# Patient Record
Sex: FEMALE | Race: OTHER | NOT HISPANIC OR LATINO | URBAN - METROPOLITAN AREA
[De-identification: names, ages, dates, MRNs, and addresses within clinical notes are randomized per-mention and may not be internally consistent; named-entity substitution may affect disease eponyms.]

---

## 2017-04-15 ENCOUNTER — INPATIENT (INPATIENT)
Facility: HOSPITAL | Age: 82
LOS: 0 days | Discharge: LEFT BEFORE DISCHARGE | DRG: 202 | End: 2017-04-16
Attending: INTERNAL MEDICINE | Admitting: INTERNAL MEDICINE
Payer: MEDICARE

## 2017-04-15 VITALS
TEMPERATURE: 98 F | OXYGEN SATURATION: 96 % | DIASTOLIC BLOOD PRESSURE: 69 MMHG | WEIGHT: 139.99 LBS | HEIGHT: 62 IN | RESPIRATION RATE: 16 BRPM | SYSTOLIC BLOOD PRESSURE: 127 MMHG | HEART RATE: 94 BPM

## 2017-04-15 LAB
ALBUMIN SERPL ELPH-MCNC: 2.7 G/DL — LOW (ref 3.5–5)
ALP SERPL-CCNC: 80 U/L — SIGNIFICANT CHANGE UP (ref 40–120)
ALT FLD-CCNC: 20 U/L DA — SIGNIFICANT CHANGE UP (ref 10–60)
ANION GAP SERPL CALC-SCNC: 8 MMOL/L — SIGNIFICANT CHANGE UP (ref 5–17)
APTT BLD: 32.5 SEC — SIGNIFICANT CHANGE UP (ref 27.5–37.4)
AST SERPL-CCNC: 20 U/L — SIGNIFICANT CHANGE UP (ref 10–40)
BASOPHILS # BLD AUTO: 0 K/UL — SIGNIFICANT CHANGE UP (ref 0–0.2)
BASOPHILS NFR BLD AUTO: 0.7 % — SIGNIFICANT CHANGE UP (ref 0–2)
BILIRUB SERPL-MCNC: 0.6 MG/DL — SIGNIFICANT CHANGE UP (ref 0.2–1.2)
BUN SERPL-MCNC: 11 MG/DL — SIGNIFICANT CHANGE UP (ref 7–18)
CALCIUM SERPL-MCNC: 9 MG/DL — SIGNIFICANT CHANGE UP (ref 8.4–10.5)
CHLORIDE SERPL-SCNC: 101 MMOL/L — SIGNIFICANT CHANGE UP (ref 96–108)
CK MB BLD-MCNC: 5.7 % — HIGH (ref 0–3.5)
CK MB CFR SERPL CALC: 1.2 NG/ML — SIGNIFICANT CHANGE UP (ref 0–3.6)
CK SERPL-CCNC: 21 U/L — SIGNIFICANT CHANGE UP (ref 21–215)
CO2 SERPL-SCNC: 28 MMOL/L — SIGNIFICANT CHANGE UP (ref 22–31)
CREAT SERPL-MCNC: 0.87 MG/DL — SIGNIFICANT CHANGE UP (ref 0.5–1.3)
EOSINOPHIL # BLD AUTO: 0 K/UL — SIGNIFICANT CHANGE UP (ref 0–0.5)
EOSINOPHIL NFR BLD AUTO: 0.2 % — SIGNIFICANT CHANGE UP (ref 0–6)
GLUCOSE SERPL-MCNC: 167 MG/DL — HIGH (ref 70–99)
HCT VFR BLD CALC: 40.5 % — SIGNIFICANT CHANGE UP (ref 34.5–45)
HGB BLD-MCNC: 12.9 G/DL — SIGNIFICANT CHANGE UP (ref 11.5–15.5)
INR BLD: 1.04 RATIO — SIGNIFICANT CHANGE UP (ref 0.88–1.16)
LYMPHOCYTES # BLD AUTO: 1.2 K/UL — SIGNIFICANT CHANGE UP (ref 1–3.3)
LYMPHOCYTES # BLD AUTO: 28.4 % — SIGNIFICANT CHANGE UP (ref 13–44)
MCHC RBC-ENTMCNC: 30.6 PG — SIGNIFICANT CHANGE UP (ref 27–34)
MCHC RBC-ENTMCNC: 31.9 GM/DL — LOW (ref 32–36)
MCV RBC AUTO: 96 FL — SIGNIFICANT CHANGE UP (ref 80–100)
MONOCYTES # BLD AUTO: 0.1 K/UL — SIGNIFICANT CHANGE UP (ref 0–0.9)
MONOCYTES NFR BLD AUTO: 2.8 % — SIGNIFICANT CHANGE UP (ref 2–14)
NEUTROPHILS # BLD AUTO: 2.8 K/UL — SIGNIFICANT CHANGE UP (ref 1.8–7.4)
NEUTROPHILS NFR BLD AUTO: 68 % — SIGNIFICANT CHANGE UP (ref 43–77)
NT-PROBNP SERPL-SCNC: 410 PG/ML — SIGNIFICANT CHANGE UP (ref 0–450)
PLATELET # BLD AUTO: 218 K/UL — SIGNIFICANT CHANGE UP (ref 150–400)
POTASSIUM SERPL-MCNC: 4.1 MMOL/L — SIGNIFICANT CHANGE UP (ref 3.5–5.3)
POTASSIUM SERPL-SCNC: 4.1 MMOL/L — SIGNIFICANT CHANGE UP (ref 3.5–5.3)
PROT SERPL-MCNC: 6.8 G/DL — SIGNIFICANT CHANGE UP (ref 6–8.3)
PROTHROM AB SERPL-ACNC: 11.3 SEC — SIGNIFICANT CHANGE UP (ref 9.8–12.7)
RBC # BLD: 4.22 M/UL — SIGNIFICANT CHANGE UP (ref 3.8–5.2)
RBC # FLD: 13.1 % — SIGNIFICANT CHANGE UP (ref 10.3–14.5)
SODIUM SERPL-SCNC: 137 MMOL/L — SIGNIFICANT CHANGE UP (ref 135–145)
TROPONIN I SERPL-MCNC: <0.015 NG/ML — SIGNIFICANT CHANGE UP (ref 0–0.04)
WBC # BLD: 4.1 K/UL — SIGNIFICANT CHANGE UP (ref 3.8–10.5)
WBC # FLD AUTO: 4.1 K/UL — SIGNIFICANT CHANGE UP (ref 3.8–10.5)

## 2017-04-15 PROCEDURE — 71010: CPT | Mod: 26

## 2017-04-15 RX ORDER — SODIUM CHLORIDE 9 MG/ML
3 INJECTION INTRAMUSCULAR; INTRAVENOUS; SUBCUTANEOUS ONCE
Qty: 0 | Refills: 0 | Status: COMPLETED | OUTPATIENT
Start: 2017-04-15 | End: 2017-04-15

## 2017-04-15 RX ORDER — IPRATROPIUM/ALBUTEROL SULFATE 18-103MCG
3 AEROSOL WITH ADAPTER (GRAM) INHALATION ONCE
Qty: 0 | Refills: 0 | Status: COMPLETED | OUTPATIENT
Start: 2017-04-15 | End: 2017-04-15

## 2017-04-15 RX ADMIN — Medication 3 MILLILITER(S): at 21:47

## 2017-04-15 RX ADMIN — Medication 3 MILLILITER(S): at 22:12

## 2017-04-15 RX ADMIN — SODIUM CHLORIDE 3 MILLILITER(S): 9 INJECTION INTRAMUSCULAR; INTRAVENOUS; SUBCUTANEOUS at 21:47

## 2017-04-15 NOTE — ED ADULT NURSE NOTE - OBJECTIVE STATEMENT
89 years old female received being nursed in high folwers position, alert and oriieted, breathing sponateoulsy on roomair. Relatievs at bedside reported that patient has history of Asthma, was having breathing difficulty and became unresponsive earlier.nresponsiveness. per family, pt had poor sleep last night 2/2 sore throat and mild difficulty breathing, was difficult to rouse for several seconds. family member dr. ian pierre [phone], pulmcrit MD advuised to give prednisone and zyrtec which briefly resolved sx, however this afternoon was much more difficult to rouse with respiratory distress vs. stridor, reported to be shaking head side to side during event. family states pt was recently d/c'd from OSH to rehab for resp distress thought to be asthma vs. URI, has been confused since. 89 years old female received being nursed in high folwers position, alert and oriented, breathing spontaneously on room air. Relatives at bedside reported that patient has history of Asthma, was having breathing difficulty and was difficult to arouse earlier. Relatives also reported that patient had poor sleep last night, complained of a sore throat. Relative also reported that patient was recently discharged from OSH to rehab for respiratory distress thought to be asthma vs. URI, has been confused since.

## 2017-04-15 NOTE — ED PROVIDER NOTE - MEDICAL DECISION MAKING DETAILS
89f pmhx dCHF, asthma p/w unresponsiveness. per family, pt had poor sleep last night 2/2 sore throat and mild difficulty breathing, was difficult to rouse for several seconds. family member dr. ian pierre [phone], pulmcrit MD advuised to give prednisone and zyrtec which briefly resolved sx, however this afternoon was much more difficult to rouse with respiratory distress vs. stridor, reported to be shaking head side to side during event. family states pt was recently d/c'd from OSH to rehab for resp distress thought to be asthma vs. URI, has been confused since. states pt still confused per baseline. on PE, VSS, in mild distress, Aox1 (self), rhonchi/wheezes b/l, +3 pedal edema. will send cardiac panel.

## 2017-04-15 NOTE — ED PROVIDER NOTE - PHYSICAL EXAMINATION
PE: CONSTITUTIONAL: Well appearing, well nourished, in mild distress. ENMT: Airway patent, nasal mucosa clear, mouth with normal mucosa. HEAD: NCAT EYES: PERRL, EOMI CARDIAC: RRR, no m/r/g, +3 pedal edema b/l RESPIRATORY: diffuse wheeze/rhonchi b/l GI: Abdomen non-distended, non-tender MSK: Spine appears normal, range of motion is not limited, no muscle/joint tenderness NEURO: CNII-XII grossly intact, 5/5 strength, full sensation all extremities, gait no assessed SKIN: No rash

## 2017-04-15 NOTE — ED PROVIDER NOTE - NS ED ROS FT
ROS: GENERAL: no fevers, no chills HEENT: no epistaxis, no eye pain, no ear, no throat pain CARDIAC: no chest pain, +shortness of breath PULM: no cough, +shortness of breath GI: no nausea, no vomiting, no diarrhea,  no abdominal pain, no hematemesis, no bright red blood per rectum : no dysuria, no hematuria EXTREMITIES: no arm pain, no leg pain, no back pain SKIN: no purpura, no petechiae NEURO: no headache, no neck pain, no loss of strength/sensation, +LOC, +confusion HEME: no easy bruising, no easy bleeding

## 2017-04-15 NOTE — ED PROVIDER NOTE - OBJECTIVE STATEMENT
89f pmhx dCHF, asthma p/w unresponsiveness. per family, pt had poor sleep last night 2/2 sore throat and mild difficulty breathing, was difficult to rouse for several seconds. family member dr. ian pierre [phone], pulmcrit MD advuised to give prednisone and zyrtec which briefly resolved sx, however this afternoon was much more difficult to rouse with respiratory distress vs. stridor, reported to be shaking head side to side during event. family states pt was recently d/c'd from OSH to rehab for resp distress thought to be asthma vs. URI, has been confused since. states pt still confused per baseline.

## 2017-04-16 VITALS
TEMPERATURE: 98 F | DIASTOLIC BLOOD PRESSURE: 53 MMHG | HEART RATE: 96 BPM | OXYGEN SATURATION: 100 % | RESPIRATION RATE: 16 BRPM | SYSTOLIC BLOOD PRESSURE: 107 MMHG

## 2017-04-16 DIAGNOSIS — J45.909 UNSPECIFIED ASTHMA, UNCOMPLICATED: ICD-10-CM

## 2017-04-16 DIAGNOSIS — I50.9 HEART FAILURE, UNSPECIFIED: ICD-10-CM

## 2017-04-16 DIAGNOSIS — F03.90 UNSPECIFIED DEMENTIA, UNSPECIFIED SEVERITY, WITHOUT BEHAVIORAL DISTURBANCE, PSYCHOTIC DISTURBANCE, MOOD DISTURBANCE, AND ANXIETY: ICD-10-CM

## 2017-04-16 DIAGNOSIS — Z29.9 ENCOUNTER FOR PROPHYLACTIC MEASURES, UNSPECIFIED: ICD-10-CM

## 2017-04-16 DIAGNOSIS — R55 SYNCOPE AND COLLAPSE: ICD-10-CM

## 2017-04-16 DIAGNOSIS — Z71.89 OTHER SPECIFIED COUNSELING: ICD-10-CM

## 2017-04-16 LAB
APPEARANCE UR: CLEAR — SIGNIFICANT CHANGE UP
BASE EXCESS BLDA CALC-SCNC: 3.1 MMOL/L — HIGH (ref -2–2)
BILIRUB UR-MCNC: NEGATIVE — SIGNIFICANT CHANGE UP
CHOLEST SERPL-MCNC: 208 MG/DL — HIGH (ref 10–199)
CK MB BLD-MCNC: 5.8 % — HIGH (ref 0–3.5)
CK MB CFR SERPL CALC: 1.1 NG/ML — SIGNIFICANT CHANGE UP (ref 0–3.6)
CK SERPL-CCNC: 19 U/L — LOW (ref 21–215)
COLOR SPEC: YELLOW — SIGNIFICANT CHANGE UP
D DIMER BLD IA.RAPID-MCNC: 3003 NG/ML DDU — HIGH
DIFF PNL FLD: ABNORMAL
GLUCOSE UR QL: NEGATIVE — SIGNIFICANT CHANGE UP
HBA1C BLD-MCNC: 5.7 % — HIGH (ref 4–5.6)
HCO3 BLDA-SCNC: 26 MMOL/L — SIGNIFICANT CHANGE UP (ref 23–27)
HDLC SERPL-MCNC: 54 MG/DL — SIGNIFICANT CHANGE UP (ref 40–125)
HOROWITZ INDEX BLDA+IHG-RTO: 21 — SIGNIFICANT CHANGE UP
KETONES UR-MCNC: NEGATIVE — SIGNIFICANT CHANGE UP
LEUKOCYTE ESTERASE UR-ACNC: NEGATIVE — SIGNIFICANT CHANGE UP
LIPID PNL WITH DIRECT LDL SERPL: 138 MG/DL — SIGNIFICANT CHANGE UP
NITRITE UR-MCNC: NEGATIVE — SIGNIFICANT CHANGE UP
PCO2 BLDA: 36 MMHG — SIGNIFICANT CHANGE UP (ref 32–46)
PH BLDA: 7.47 — HIGH (ref 7.35–7.45)
PH UR: 6 — SIGNIFICANT CHANGE UP (ref 4.8–8)
PO2 BLDA: 57 MMHG — LOW (ref 74–108)
PROT UR-MCNC: NEGATIVE — SIGNIFICANT CHANGE UP
SAO2 % BLDA: 90 % — LOW (ref 92–96)
SP GR SPEC: 1.01 — SIGNIFICANT CHANGE UP (ref 1.01–1.02)
TOTAL CHOLESTEROL/HDL RATIO MEASUREMENT: 3.9 RATIO — SIGNIFICANT CHANGE UP (ref 3.3–7.1)
TRIGL SERPL-MCNC: 81 MG/DL — SIGNIFICANT CHANGE UP (ref 10–149)
TROPONIN I SERPL-MCNC: <0.015 NG/ML — SIGNIFICANT CHANGE UP (ref 0–0.04)
TSH SERPL-MCNC: 0.75 UU/ML — SIGNIFICANT CHANGE UP (ref 0.34–4.82)
UROBILINOGEN FLD QL: 1

## 2017-04-16 PROCEDURE — 81001 URINALYSIS AUTO W/SCOPE: CPT

## 2017-04-16 PROCEDURE — 80053 COMPREHEN METABOLIC PANEL: CPT

## 2017-04-16 PROCEDURE — 85027 COMPLETE CBC AUTOMATED: CPT

## 2017-04-16 PROCEDURE — 71045 X-RAY EXAM CHEST 1 VIEW: CPT

## 2017-04-16 PROCEDURE — 83880 ASSAY OF NATRIURETIC PEPTIDE: CPT

## 2017-04-16 PROCEDURE — 84443 ASSAY THYROID STIM HORMONE: CPT

## 2017-04-16 PROCEDURE — 94640 AIRWAY INHALATION TREATMENT: CPT

## 2017-04-16 PROCEDURE — 85730 THROMBOPLASTIN TIME PARTIAL: CPT

## 2017-04-16 PROCEDURE — 85379 FIBRIN DEGRADATION QUANT: CPT

## 2017-04-16 PROCEDURE — 80061 LIPID PANEL: CPT

## 2017-04-16 PROCEDURE — 82550 ASSAY OF CK (CPK): CPT

## 2017-04-16 PROCEDURE — 84484 ASSAY OF TROPONIN QUANT: CPT

## 2017-04-16 PROCEDURE — 85610 PROTHROMBIN TIME: CPT

## 2017-04-16 PROCEDURE — 93005 ELECTROCARDIOGRAM TRACING: CPT

## 2017-04-16 PROCEDURE — 82553 CREATINE MB FRACTION: CPT

## 2017-04-16 PROCEDURE — 99285 EMERGENCY DEPT VISIT HI MDM: CPT

## 2017-04-16 PROCEDURE — 83036 HEMOGLOBIN GLYCOSYLATED A1C: CPT

## 2017-04-16 PROCEDURE — 99285 EMERGENCY DEPT VISIT HI MDM: CPT | Mod: 25

## 2017-04-16 PROCEDURE — 82803 BLOOD GASES ANY COMBINATION: CPT

## 2017-04-16 RX ORDER — MONTELUKAST 4 MG/1
10 TABLET, CHEWABLE ORAL AT BEDTIME
Qty: 0 | Refills: 0 | Status: DISCONTINUED | OUTPATIENT
Start: 2017-04-16 | End: 2017-04-16

## 2017-04-16 RX ORDER — ENOXAPARIN SODIUM 100 MG/ML
65 INJECTION SUBCUTANEOUS ONCE
Qty: 0 | Refills: 0 | Status: COMPLETED | OUTPATIENT
Start: 2017-04-16 | End: 2017-04-16

## 2017-04-16 RX ORDER — ASPIRIN/CALCIUM CARB/MAGNESIUM 324 MG
81 TABLET ORAL DAILY
Qty: 0 | Refills: 0 | Status: DISCONTINUED | OUTPATIENT
Start: 2017-04-16 | End: 2017-04-16

## 2017-04-16 RX ORDER — FUROSEMIDE 40 MG
20 TABLET ORAL ONCE
Qty: 0 | Refills: 0 | Status: COMPLETED | OUTPATIENT
Start: 2017-04-16 | End: 2017-04-16

## 2017-04-16 RX ORDER — ENOXAPARIN SODIUM 100 MG/ML
40 INJECTION SUBCUTANEOUS DAILY
Qty: 0 | Refills: 0 | Status: DISCONTINUED | OUTPATIENT
Start: 2017-04-16 | End: 2017-04-16

## 2017-04-16 RX ORDER — SODIUM CHLORIDE 9 MG/ML
500 INJECTION INTRAMUSCULAR; INTRAVENOUS; SUBCUTANEOUS ONCE
Qty: 0 | Refills: 0 | Status: COMPLETED | OUTPATIENT
Start: 2017-04-16 | End: 2017-04-16

## 2017-04-16 RX ORDER — IPRATROPIUM/ALBUTEROL SULFATE 18-103MCG
3 AEROSOL WITH ADAPTER (GRAM) INHALATION EVERY 6 HOURS
Qty: 0 | Refills: 0 | Status: DISCONTINUED | OUTPATIENT
Start: 2017-04-16 | End: 2017-04-16

## 2017-04-16 RX ORDER — PANTOPRAZOLE SODIUM 20 MG/1
40 TABLET, DELAYED RELEASE ORAL DAILY
Qty: 0 | Refills: 0 | Status: DISCONTINUED | OUTPATIENT
Start: 2017-04-16 | End: 2017-04-16

## 2017-04-16 RX ORDER — BUDESONIDE AND FORMOTEROL FUMARATE DIHYDRATE 160; 4.5 UG/1; UG/1
1 AEROSOL RESPIRATORY (INHALATION)
Qty: 0 | Refills: 0 | Status: DISCONTINUED | OUTPATIENT
Start: 2017-04-16 | End: 2017-04-16

## 2017-04-16 RX ORDER — ENOXAPARIN SODIUM 100 MG/ML
40 INJECTION SUBCUTANEOUS DAILY
Qty: 0 | Refills: 0 | Status: DISCONTINUED | OUTPATIENT
Start: 2017-04-17 | End: 2017-04-16

## 2017-04-16 RX ORDER — PANTOPRAZOLE SODIUM 20 MG/1
40 TABLET, DELAYED RELEASE ORAL
Qty: 0 | Refills: 0 | Status: DISCONTINUED | OUTPATIENT
Start: 2017-04-16 | End: 2017-04-16

## 2017-04-16 RX ADMIN — Medication 3 MILLILITER(S): at 08:22

## 2017-04-16 RX ADMIN — ENOXAPARIN SODIUM 65 MILLIGRAM(S): 100 INJECTION SUBCUTANEOUS at 08:19

## 2017-04-16 RX ADMIN — Medication 81 MILLIGRAM(S): at 11:33

## 2017-04-16 RX ADMIN — Medication 3 MILLILITER(S): at 03:54

## 2017-04-16 RX ADMIN — SODIUM CHLORIDE 500 MILLILITER(S): 9 INJECTION INTRAMUSCULAR; INTRAVENOUS; SUBCUTANEOUS at 03:54

## 2017-04-16 RX ADMIN — Medication 40 MILLIGRAM(S): at 03:54

## 2017-04-16 RX ADMIN — PANTOPRAZOLE SODIUM 40 MILLIGRAM(S): 20 TABLET, DELAYED RELEASE ORAL at 11:33

## 2017-04-16 RX ADMIN — Medication 20 MILLIGRAM(S): at 04:52

## 2017-04-16 RX ADMIN — Medication 125 MILLIGRAM(S): at 05:48

## 2017-04-16 NOTE — H&P ADULT. - PROBLEM SELECTOR PLAN 5
Patient has sinus tachycardic and Tachypneic with hypoxia  H/o Immobilisation  Elevated D dimer 3003  S/p one full dose Lovenox  F/u CTA Chest

## 2017-04-16 NOTE — H&P ADULT. - HISTORY OF PRESENT ILLNESS
89 year old F with PMH of asthma, HLD, Htn, HfpEF 89 year old F with PMH of asthma, HLD, Htn, HfpEF recently admitted to outside hospital for status asthmatics in Feb 2017 (was intubated at that time) presented to ED because of difficulty breathing.    ID [id number], Armenian , tried but patient was too lethargic and  could not be used. Called the daughter Bibiana Bhatia at  to get the history. She states that patient is having increased shortness of breath since yesterday. Family gave prednisone on advise of a doctor in the family. Today, patient was difficult to arouse so family brought patient to the ED.   Goals of care: Talked to the daughter, denies resuscitation, intubation, central line and even Brooks catheter. Explained that Brooks was placed in the ED but will remove now. Daughter explained that patient is confused at baseline. 89 year old F with PMH of asthma, HLD, Htn, HfpEF recently admitted to some outside hospital for status asthmatics in Feb 2017 (was intubated at that time and then was on BiPAP, later weaned off) presented to ED because of difficulty breathing.    ID [id number], Amharic  tried but patient was too lethargic and  phone could not be used. Called the daughter Bibiana Bhatia at  to get the history. She states that patient is having increased shortness of breath since yesterday. Family gave prednisone on advise of a doctor in the family. Today, patient was difficult to arouse so family brought patient to the ED. Denies fever, cough, vomiting, chest pain, flu like symptoms.   Goals of care: Talked to the daughter, denies resuscitation, intubation, central line and even Brooks catheter. Explained that Brooks was placed in the ED but will remove now. Daughter explained that patient is confused at baseline and is difficult to communicate. Daughter states that her Systolic BP usually runs in  so all the BP meds were discontinued. Confirmed medication with daughter on phone she is not sure of dose of Lasix. 89 year old F with PMH of asthma, HLD, Htn, HfpEF recently admitted to some outside hospital for status asthmatics in Feb 2017 (was intubated at that time and then was on BiPAP, later weaned off) presented to ED because of difficulty breathing.    ID [id number], Yakut  tried but patient was too lethargic and  phone could not be used. Called the daughter Bibiana Bhatia at  to get the history. She states that patient is having increased shortness of breath since yesterday. Family gave prednisone on advise of a doctor in the family. Today, patient was difficult to arouse so family brought patient to the ED. Denies fever, cough, vomiting, chest pain, flu like symptoms.   Goals of care: Talked to the daughter, denies resuscitation, intubation, central line and even Brooks catheter. Explained that Brooks was placed in the ED but will remove now. Daughter explained that patient is confused at baseline and is difficult to communicate. Daughter states that her Systolic BP usually runs in  so all the BP meds were discontinued. Confirmed medication with daughter on phone she is not sure of dose of Lasix.   Pharmacy [facility] , confirm medication in AM

## 2017-04-16 NOTE — DISCHARGE NOTE ADULT - MEDICATION SUMMARY - MEDICATIONS TO TAKE
I will START or STAY ON the medications listed below when I get home from the hospital:    Medrol Dosepak 4 mg oral tablet  -- 6 tab(s) by mouth once a day x 1 days  5 tab(s) by mouth once a day x 1 days  4 tab(s) by mouth once a day x 1 days  3 tab(s) by mouth once a day x 1 days  2 tab(s) by mouth once a day x 1 days  1 tab(s) by mouth once a day x 1 days  -- It is very important that you take or use this exactly as directed.  Do not skip doses or discontinue unless directed by your doctor.  Obtain medical advice before taking any non-prescription drugs as some may affect the action of this medication.  Take with food or milk.    -- Indication: For Asthma    aspirin 81 mg oral tablet, chewable  -- 1 tab(s) by mouth once a day  -- Indication: For CHF (congestive heart failure)    Cardizem 60 mg oral tablet  -- 1 tab(s) by mouth 3 times a day  -- Indication: For CHF (congestive heart failure)    levocetirizine 5 mg oral tablet  -- 1 tab(s) by mouth once a day (in the evening)  -- Indication: For Asthma    simvastatin 10 mg oral tablet  -- 1 tab(s) by mouth once a day (at bedtime)  -- Indication: For CHF (congestive heart failure)    Brovana 15 mcg/2 mL inhalation solution  -- 2 milliliter(s) inhaled 2 times a day  -- Indication: For Asthma    budesonide-formoterol 80 mcg-4.5 mcg/inh inhalation aerosol  -- 2 puff(s) inhaled 2 times a day  -- Indication: For Asthma    Lasix 40 mg oral tablet  -- 1 tab(s) by mouth once a day  -- Indication: For CHF (congestive heart failure)    Singulair 10 mg oral tablet  -- 1 tab(s) by mouth once a day  -- Indication: For Asthma    potassium chloride 20 mEq oral granule, extended release  -- 1 cap(s) by mouth once a day  -- Indication: For Prophylactic measure    Vigamox 0.5% ophthalmic solution  -- 1 drop(s) to each affected eye 2 times a day  -- Indication: For Glaucoma    Lumigan 0.01% ophthalmic solution  -- 1 drop(s) to each affected eye once a day (in the evening)  -- Indication: For Glaucoma    pantoprazole 40 mg oral delayed release tablet  -- 1 tab(s) by mouth once a day  -- Indication: For Prophylactic measure

## 2017-04-16 NOTE — DISCHARGE NOTE ADULT - HOSPITAL COURSE
89 year old F with PMH of asthma, HLD, HfpEF recently admitted to some outside hospital for status asthmatics in Feb 2017 (was intubated at that time and then was on BiPAP, later weaned off) presented to ED because of difficulty breathing.    ID [id number], Maori  tried but patient was too lethargic and  phone could not be used. Called the daughter Bibiana Bhatia at  to get the history. She states that patient is having increased shortness of breath since yesterday. Family gave prednisone on advise of a doctor in the family. Today, patient was difficult to arouse so family brought patient to the ED. Denies fever, cough, vomiting, chest pain, flu like symptoms.   Goals of care: Talked to the daughter, denies resuscitation, intubation, central line and even Brooks catheter. Explained that Brooks was placed in the ED but will remove now. Daughter explained that patient is confused at baseline and is difficult to communicate. Daughter states that her Systolic BP usually runs in  so all the BP meds were discontinued. Confirmed medication with daughter on phone she is not sure of dose of Lasix.    Asthma.-s/p duonebsm solu-medrol.  s/p Pulmonary evalPatient having worsening shortness of breath, and increased lethargy.  Patient has b/l crackles, No use of accessory muscles of respiration.   PH 7.47/Co2 36/ PO2 57, Hco3 28 on room air.   One 20 mg iv push of Lasix given, primary team to confirm dose of Lasix and start.   Patient is saturating 97 percent on Ventimask  Wean it off to nasal cannula if clinical condition allows.  Solumedrol 125 given, continue 40 Q8  Taper steroids as needed.(Dr. Puentes)  CHF (congestive heart failure).  Patient had h/o CHF with B/l crackles, Pedal edema 2+  s/p  IV lasix  -D-yypzu=[id number],  pt's family refused CTA  Pro   s/p Full dose lovenox  Albumin is 2.7, swelling could be due to hypo albuminemia.   F/u Echo.      Goals of care, counseling/discussion.  Discussed with daughter about advance directives, daughter is interested in comfort care  Refused central line, intubation and chest compression  Wants to discuss with her relative Dr Garcia to discuss about long term anticoagulation and use of BiPAP.  Wants palliative care team consult.  Family refuses further work-up. Insists on taking pt. home AMA. Risks including death discussed. Daughter verbalizes understanding. Still insists on taking pt. home.  Dr. Stephens made aware

## 2017-04-16 NOTE — H&P ADULT. - ASSESSMENT
89 year old F with PMH of asthma, HLD, HfpEF recently admitted to some outside hospital for status asthmatics in Feb 2017 (was intubated at that time and then was on BiPAP, later weaned off) presented to ED because of difficulty breathing.    ID [id number], Portuguese  tried but patient was too lethargic and  phone could not be used. Called the daughter Bibiana Bhatia at  to get the history. She states that patient is having increased shortness of breath since yesterday. Family gave prednisone on advise of a doctor in the family. Today, patient was difficult to arouse so family brought patient to the ED. Denies fever, cough, vomiting, chest pain, flu like symptoms.   Goals of care: Talked to the daughter, denies resuscitation, intubation, central line and even Brooks catheter. Explained that Brooks was placed in the ED but will remove now. Daughter explained that patient is confused at baseline and is difficult to communicate. Daughter states that her Systolic BP usually runs in  so all the BP meds were discontinued. Confirmed medication with daughter on phone she is not sure of dose of Lasix.

## 2017-04-16 NOTE — DISCHARGE NOTE ADULT - CARE PROVIDER_API CALL
Jorge Alberto Stephens), Internal Medicine  [address]  [place] [address]  Phone: [phone]  Fax: [phone]

## 2017-04-16 NOTE — ED ADULT NURSE REASSESSMENT NOTE - NS ED NURSE REASSESS COMMENT FT1
Patient alert verbally responsive breathing unlabored D/C tele and Ventimask as per VANDANA Reed VSS Sao2 99 % with N/C 3lit min   Pt refuse to CTA VANDANA Reed and DR Castro aware

## 2017-04-16 NOTE — H&P ADULT. - PROBLEM SELECTOR PLAN 1
Patient having worsening shortness of breath, and increased lethargy.  Patient has b/l crackles, No use of accessory muscles of respiration.   PH 7.47/Co2 36/ PO2 57, Hco3 28 on room air.   One 20 mg iv push of Lasix given, primary team to confirm dose of Lasix and start.   Patient is saturating 97 percent on Ventimask  Solumedrol 125 given, continue 40 Q8  Taper steroids as needed. Patient having worsening shortness of breath, and increased lethargy.  Patient has b/l crackles, No use of accessory muscles of respiration.   PH 7.47/Co2 36/ PO2 57, Hco3 28 on room air.   One 20 mg iv push of Lasix given, primary team to confirm dose of Lasix and start.   Patient is saturating 97 percent on Ventimask  Wean it off to nasal cannula if clinical condition allows.  Solumedrol 125 given, continue 40 Q8  Taper steroids as needed. Patient having worsening shortness of breath, and increased lethargy.  Patient has b/l crackles, No use of accessory muscles of respiration.   PH 7.47/Co2 36/ PO2 57, Hco3 28 on room air.   One 20 mg iv push of Lasix given, primary team to confirm dose of Lasix and start.   Patient is saturating 97 percent on Ventimask  Wean it off to nasal cannula if clinical condition allows.  Solumedrol 125 given, continue 40 Q8  Taper steroids as needed.  Pulmonary Dr Puentes

## 2017-04-16 NOTE — ED ADULT NURSE REASSESSMENT NOTE - NS ED NURSE REASSESS COMMENT FT1
Pt alert verbally responsive breathing unlabored due meds given,  family and Pt demanded to D/C home refusing to further Tx and hospitalization.  NP Brianna  aware All risk and benefits explained  Family verbalized understanding, however still insisted leaving AMA. Pt VSS

## 2017-04-16 NOTE — H&P ADULT. - PROBLEM SELECTOR PLAN 6
Discussed with daughter about advance directives, daughter is interested in comfort care  Refused central line, intubation and chest compression  Wants to discuss with her relative Dr Garcia to discuss about long term anticoagulation and use of BiPAP.  Wants palliative care team consult.

## 2017-04-16 NOTE — DISCHARGE NOTE ADULT - CARE PLAN
Principal Discharge DX:	Syncope  Goal:	resolved  Instructions for follow-up, activity and diet:	pt feels better. Family wants to take pt. AMA  Secondary Diagnosis:	Asthma exacerbation  Instructions for follow-up, activity and diet:	continue inhalers. Complete steroids course  Secondary Diagnosis:	CHF (congestive heart failure)  Instructions for follow-up, activity and diet:	continue medications  Secondary Diagnosis:	Dementia  Instructions for follow-up, activity and diet:	continue medications

## 2017-04-16 NOTE — DISCHARGE NOTE ADULT - PLAN OF CARE
resolved pt feels better. Family wants to take pt. AMA continue inhalers. Complete steroids course continue medications

## 2017-04-16 NOTE — DISCHARGE NOTE ADULT - PATIENT PORTAL LINK FT
“You can access the [facility] Patient Portal, offered by [facility], by registering with the following website: [email or web address]”

## 2017-04-16 NOTE — H&P ADULT. - PROBLEM SELECTOR PLAN 2
Patient had h/o CHF  B/l crackles, Pedal edema 2+  One dose of Lasix given, primary team to confirm home dose.   Pro  Patient had h/o CHF  B/l crackles, Pedal edema 2+  One dose of Lasix given, primary team to confirm home dose.   Pro   Will continue Lasix 40 with parameters Patient had h/o CHF with B/l crackles, Pedal edema 2+  One dose of Lasix given, primary team to confirm home dose.   Pro   Albumin is 2.7, swelling could be due to hypo albuminemia.   Will continue Lasix 40 with parameters    F/u Echo Patient had h/o CHF with B/l crackles, Pedal edema 2+  One dose of Lasix given, primary team to confirm home dose.   Holding lasix as CTA will ne done today.   Pro   Albumin is 2.7, swelling could be due to hypo albuminemia.   F/u Echo

## 2017-04-16 NOTE — ED ADULT NURSE REASSESSMENT NOTE - NS ED NURSE REASSESS COMMENT FT1
Patient condition remains fair. Patient was lethargic at times, is much improved. Cardio-pulmonary status fair. Oxygen in progress via venturi mask @ 35%. Patient is being nursed on cardiac monitor, sinus rhythm noted. Vitals charted. Patient is being admitted, awaiting bed availability.

## 2017-04-24 DIAGNOSIS — E78.5 HYPERLIPIDEMIA, UNSPECIFIED: ICD-10-CM

## 2017-04-24 DIAGNOSIS — Z66 DO NOT RESUSCITATE: ICD-10-CM

## 2017-04-24 DIAGNOSIS — F03.90 UNSPECIFIED DEMENTIA, UNSPECIFIED SEVERITY, WITHOUT BEHAVIORAL DISTURBANCE, PSYCHOTIC DISTURBANCE, MOOD DISTURBANCE, AND ANXIETY: ICD-10-CM

## 2017-04-24 DIAGNOSIS — R00.0 TACHYCARDIA, UNSPECIFIED: ICD-10-CM

## 2017-04-24 DIAGNOSIS — J02.9 ACUTE PHARYNGITIS, UNSPECIFIED: ICD-10-CM

## 2017-04-24 DIAGNOSIS — I50.32 CHRONIC DIASTOLIC (CONGESTIVE) HEART FAILURE: ICD-10-CM

## 2017-04-24 DIAGNOSIS — Z91.19 PATIENT'S NONCOMPLIANCE WITH OTHER MEDICAL TREATMENT AND REGIMEN: ICD-10-CM

## 2017-04-24 DIAGNOSIS — R06.82 TACHYPNEA, NOT ELSEWHERE CLASSIFIED: ICD-10-CM

## 2017-04-24 DIAGNOSIS — I11.0 HYPERTENSIVE HEART DISEASE WITH HEART FAILURE: ICD-10-CM

## 2017-04-24 DIAGNOSIS — Z53.21 PROCEDURE AND TREATMENT NOT CARRIED OUT DUE TO PATIENT LEAVING PRIOR TO BEING SEEN BY HEALTH CARE PROVIDER: ICD-10-CM

## 2017-04-24 DIAGNOSIS — J45.901 UNSPECIFIED ASTHMA WITH (ACUTE) EXACERBATION: ICD-10-CM

## 2017-04-24 DIAGNOSIS — R55 SYNCOPE AND COLLAPSE: ICD-10-CM

## 2017-04-24 DIAGNOSIS — Z28.21 IMMUNIZATION NOT CARRIED OUT BECAUSE OF PATIENT REFUSAL: ICD-10-CM

## 2020-01-01 ENCOUNTER — INPATIENT (INPATIENT)
Facility: HOSPITAL | Age: 85
LOS: 0 days | DRG: 951 | End: 2020-04-13
Attending: INTERNAL MEDICINE | Admitting: HOSPITALIST
Payer: MEDICARE

## 2020-01-01 VITALS — RESPIRATION RATE: 8 BRPM | OXYGEN SATURATION: 90 %

## 2020-01-01 VITALS
TEMPERATURE: 100 F | DIASTOLIC BLOOD PRESSURE: 66 MMHG | HEART RATE: 133 BPM | OXYGEN SATURATION: 85 % | SYSTOLIC BLOOD PRESSURE: 110 MMHG | RESPIRATION RATE: 45 BRPM

## 2020-01-01 DIAGNOSIS — R68.89 OTHER GENERAL SYMPTOMS AND SIGNS: ICD-10-CM

## 2020-01-01 DIAGNOSIS — J96.91 RESPIRATORY FAILURE, UNSPECIFIED WITH HYPOXIA: ICD-10-CM

## 2020-01-01 DIAGNOSIS — E86.0 DEHYDRATION: ICD-10-CM

## 2020-01-01 DIAGNOSIS — R45.1 RESTLESSNESS AND AGITATION: ICD-10-CM

## 2020-01-01 DIAGNOSIS — R06.03 ACUTE RESPIRATORY DISTRESS: ICD-10-CM

## 2020-01-01 DIAGNOSIS — Z51.5 ENCOUNTER FOR PALLIATIVE CARE: ICD-10-CM

## 2020-01-01 DIAGNOSIS — J96.21 ACUTE AND CHRONIC RESPIRATORY FAILURE WITH HYPOXIA: ICD-10-CM

## 2020-01-01 DIAGNOSIS — Z71.89 OTHER SPECIFIED COUNSELING: ICD-10-CM

## 2020-01-01 LAB
ALBUMIN SERPL ELPH-MCNC: 2.7 G/DL — LOW (ref 3.3–5)
ALP SERPL-CCNC: 89 U/L — SIGNIFICANT CHANGE UP (ref 40–120)
ALT FLD-CCNC: 11 U/L — SIGNIFICANT CHANGE UP (ref 10–45)
ANION GAP SERPL CALC-SCNC: 18 MMOL/L — HIGH (ref 5–17)
APTT BLD: 35.9 SEC — SIGNIFICANT CHANGE UP (ref 27.5–36.3)
AST SERPL-CCNC: 17 U/L — SIGNIFICANT CHANGE UP (ref 10–40)
BASOPHILS # BLD AUTO: 0.04 K/UL — SIGNIFICANT CHANGE UP (ref 0–0.2)
BASOPHILS NFR BLD AUTO: 0.3 % — SIGNIFICANT CHANGE UP (ref 0–2)
BILIRUB SERPL-MCNC: 1.3 MG/DL — HIGH (ref 0.2–1.2)
BUN SERPL-MCNC: 45 MG/DL — HIGH (ref 7–23)
CALCIUM SERPL-MCNC: 9.2 MG/DL — SIGNIFICANT CHANGE UP (ref 8.4–10.5)
CHLORIDE SERPL-SCNC: 113 MMOL/L — HIGH (ref 96–108)
CK SERPL-CCNC: 14 U/L — LOW (ref 25–170)
CO2 SERPL-SCNC: 24 MMOL/L — SIGNIFICANT CHANGE UP (ref 22–31)
CREAT SERPL-MCNC: 1.53 MG/DL — HIGH (ref 0.5–1.3)
CRP SERPL-MCNC: 34.37 MG/DL — HIGH (ref 0–0.4)
D DIMER BLD IA.RAPID-MCNC: 3677 NG/ML DDU — HIGH
EOSINOPHIL # BLD AUTO: 0.01 K/UL — SIGNIFICANT CHANGE UP (ref 0–0.5)
EOSINOPHIL NFR BLD AUTO: 0.1 % — SIGNIFICANT CHANGE UP (ref 0–6)
FERRITIN SERPL-MCNC: 1134 NG/ML — HIGH (ref 15–150)
FIBRINOGEN PPP-MCNC: 637 MG/DL — HIGH (ref 350–510)
GLUCOSE SERPL-MCNC: 168 MG/DL — HIGH (ref 70–99)
HCT VFR BLD CALC: 54 % — HIGH (ref 34.5–45)
HGB BLD-MCNC: 16.1 G/DL — HIGH (ref 11.5–15.5)
IMM GRANULOCYTES NFR BLD AUTO: 1.1 % — SIGNIFICANT CHANGE UP (ref 0–1.5)
INR BLD: 1.48 RATIO — HIGH (ref 0.88–1.16)
LACTATE SERPL-SCNC: 2.3 MMOL/L — HIGH (ref 0.7–2)
LDH SERPL L TO P-CCNC: 272 U/L — HIGH (ref 50–242)
LYMPHOCYTES # BLD AUTO: 1.92 K/UL — SIGNIFICANT CHANGE UP (ref 1–3.3)
LYMPHOCYTES # BLD AUTO: 12.3 % — LOW (ref 13–44)
MCHC RBC-ENTMCNC: 29.8 GM/DL — LOW (ref 32–36)
MCHC RBC-ENTMCNC: 29.9 PG — SIGNIFICANT CHANGE UP (ref 27–34)
MCV RBC AUTO: 100.4 FL — HIGH (ref 80–100)
MONOCYTES # BLD AUTO: 1.12 K/UL — HIGH (ref 0–0.9)
MONOCYTES NFR BLD AUTO: 7.2 % — SIGNIFICANT CHANGE UP (ref 2–14)
NEUTROPHILS # BLD AUTO: 12.32 K/UL — HIGH (ref 1.8–7.4)
NEUTROPHILS NFR BLD AUTO: 79 % — HIGH (ref 43–77)
NRBC # BLD: 0 /100 WBCS — SIGNIFICANT CHANGE UP (ref 0–0)
NT-PROBNP SERPL-SCNC: 6503 PG/ML — HIGH (ref 0–300)
PLATELET # BLD AUTO: 220 K/UL — SIGNIFICANT CHANGE UP (ref 150–400)
POTASSIUM SERPL-MCNC: 4.1 MMOL/L — SIGNIFICANT CHANGE UP (ref 3.5–5.3)
POTASSIUM SERPL-SCNC: 4.1 MMOL/L — SIGNIFICANT CHANGE UP (ref 3.5–5.3)
PROCALCITONIN SERPL-MCNC: 0.54 NG/ML — HIGH (ref 0.02–0.1)
PROT SERPL-MCNC: 6.5 G/DL — SIGNIFICANT CHANGE UP (ref 6–8.3)
PROTHROM AB SERPL-ACNC: 17.2 SEC — HIGH (ref 10–12.9)
RBC # BLD: 5.38 M/UL — HIGH (ref 3.8–5.2)
RBC # FLD: 16.4 % — HIGH (ref 10.3–14.5)
SARS-COV-2 RNA SPEC QL NAA+PROBE: DETECTED
SODIUM SERPL-SCNC: 155 MMOL/L — HIGH (ref 135–145)
TROPONIN T, HIGH SENSITIVITY RESULT: 75 NG/L — HIGH (ref 0–51)
WBC # BLD: 15.58 K/UL — HIGH (ref 3.8–10.5)
WBC # FLD AUTO: 15.58 K/UL — HIGH (ref 3.8–10.5)

## 2020-01-01 PROCEDURE — 85014 HEMATOCRIT: CPT

## 2020-01-01 PROCEDURE — 93005 ELECTROCARDIOGRAM TRACING: CPT

## 2020-01-01 PROCEDURE — 84295 ASSAY OF SERUM SODIUM: CPT

## 2020-01-01 PROCEDURE — 99291 CRITICAL CARE FIRST HOUR: CPT | Mod: CS,GC

## 2020-01-01 PROCEDURE — 82435 ASSAY OF BLOOD CHLORIDE: CPT

## 2020-01-01 PROCEDURE — 99233 SBSQ HOSP IP/OBS HIGH 50: CPT

## 2020-01-01 PROCEDURE — 85379 FIBRIN DEGRADATION QUANT: CPT

## 2020-01-01 PROCEDURE — 87186 SC STD MICRODIL/AGAR DIL: CPT

## 2020-01-01 PROCEDURE — 85027 COMPLETE CBC AUTOMATED: CPT

## 2020-01-01 PROCEDURE — 87150 DNA/RNA AMPLIFIED PROBE: CPT

## 2020-01-01 PROCEDURE — 82947 ASSAY GLUCOSE BLOOD QUANT: CPT

## 2020-01-01 PROCEDURE — 87635 SARS-COV-2 COVID-19 AMP PRB: CPT

## 2020-01-01 PROCEDURE — 82550 ASSAY OF CK (CPK): CPT

## 2020-01-01 PROCEDURE — 82803 BLOOD GASES ANY COMBINATION: CPT

## 2020-01-01 PROCEDURE — 82728 ASSAY OF FERRITIN: CPT

## 2020-01-01 PROCEDURE — 71045 X-RAY EXAM CHEST 1 VIEW: CPT

## 2020-01-01 PROCEDURE — 84145 PROCALCITONIN (PCT): CPT

## 2020-01-01 PROCEDURE — 85730 THROMBOPLASTIN TIME PARTIAL: CPT

## 2020-01-01 PROCEDURE — 86140 C-REACTIVE PROTEIN: CPT

## 2020-01-01 PROCEDURE — 84484 ASSAY OF TROPONIN QUANT: CPT

## 2020-01-01 PROCEDURE — 96374 THER/PROPH/DIAG INJ IV PUSH: CPT

## 2020-01-01 PROCEDURE — 84132 ASSAY OF SERUM POTASSIUM: CPT

## 2020-01-01 PROCEDURE — 99223 1ST HOSP IP/OBS HIGH 75: CPT | Mod: CS

## 2020-01-01 PROCEDURE — 83880 ASSAY OF NATRIURETIC PEPTIDE: CPT

## 2020-01-01 PROCEDURE — 87040 BLOOD CULTURE FOR BACTERIA: CPT

## 2020-01-01 PROCEDURE — 85384 FIBRINOGEN ACTIVITY: CPT

## 2020-01-01 PROCEDURE — 80053 COMPREHEN METABOLIC PANEL: CPT

## 2020-01-01 PROCEDURE — 83605 ASSAY OF LACTIC ACID: CPT

## 2020-01-01 PROCEDURE — 82330 ASSAY OF CALCIUM: CPT

## 2020-01-01 PROCEDURE — 99291 CRITICAL CARE FIRST HOUR: CPT | Mod: 25

## 2020-01-01 PROCEDURE — 85610 PROTHROMBIN TIME: CPT

## 2020-01-01 PROCEDURE — 83615 LACTATE (LD) (LDH) ENZYME: CPT

## 2020-01-01 PROCEDURE — 71045 X-RAY EXAM CHEST 1 VIEW: CPT | Mod: 26

## 2020-01-01 RX ORDER — SODIUM CHLORIDE 9 MG/ML
500 INJECTION INTRAMUSCULAR; INTRAVENOUS; SUBCUTANEOUS ONCE
Refills: 0 | Status: COMPLETED | OUTPATIENT
Start: 2020-01-01 | End: 2020-01-01

## 2020-01-01 RX ORDER — ACETAMINOPHEN 500 MG
1000 TABLET ORAL ONCE
Refills: 0 | Status: COMPLETED | OUTPATIENT
Start: 2020-01-01 | End: 2020-01-01

## 2020-01-01 RX ORDER — HYDROMORPHONE HYDROCHLORIDE 2 MG/ML
0.2 INJECTION INTRAMUSCULAR; INTRAVENOUS; SUBCUTANEOUS
Refills: 0 | Status: DISCONTINUED | OUTPATIENT
Start: 2020-01-01 | End: 2020-01-01

## 2020-01-01 RX ORDER — BUDESONIDE AND FORMOTEROL FUMARATE DIHYDRATE 160; 4.5 UG/1; UG/1
2 AEROSOL RESPIRATORY (INHALATION)
Qty: 0 | Refills: 0 | DISCHARGE

## 2020-01-01 RX ORDER — MOXIFLOXACIN HCL 0.5 %
1 DROPS OPHTHALMIC (EYE)
Qty: 0 | Refills: 0 | DISCHARGE

## 2020-01-01 RX ORDER — ROBINUL 0.2 MG/ML
0.4 INJECTION INTRAMUSCULAR; INTRAVENOUS EVERY 6 HOURS
Refills: 0 | Status: DISCONTINUED | OUTPATIENT
Start: 2020-01-01 | End: 2020-01-01

## 2020-01-01 RX ORDER — ARFORMOTEROL TARTRATE 15 UG/2ML
2 SOLUTION RESPIRATORY (INHALATION)
Qty: 0 | Refills: 0 | DISCHARGE

## 2020-01-01 RX ORDER — POTASSIUM CHLORIDE 20 MEQ
1 PACKET (EA) ORAL
Qty: 0 | Refills: 0 | DISCHARGE

## 2020-01-01 RX ORDER — BIMATOPROST 0.3 MG/ML
1 SOLUTION/ DROPS OPHTHALMIC
Qty: 0 | Refills: 0 | DISCHARGE

## 2020-01-01 RX ORDER — HYDROMORPHONE HYDROCHLORIDE 2 MG/ML
0.2 INJECTION INTRAMUSCULAR; INTRAVENOUS; SUBCUTANEOUS ONCE
Refills: 0 | Status: DISCONTINUED | OUTPATIENT
Start: 2020-01-01 | End: 2020-01-01

## 2020-01-01 RX ORDER — ROBINUL 0.2 MG/ML
0.4 INJECTION INTRAMUSCULAR; INTRAVENOUS ONCE
Refills: 0 | Status: COMPLETED | OUTPATIENT
Start: 2020-01-01 | End: 2020-01-01

## 2020-01-01 RX ORDER — SIMVASTATIN 20 MG/1
1 TABLET, FILM COATED ORAL
Qty: 0 | Refills: 0 | DISCHARGE

## 2020-01-01 RX ORDER — FUROSEMIDE 40 MG
1 TABLET ORAL
Qty: 0 | Refills: 0 | DISCHARGE

## 2020-01-01 RX ORDER — MORPHINE SULFATE 50 MG/1
0.5 CAPSULE, EXTENDED RELEASE ORAL
Qty: 100 | Refills: 0 | Status: DISCONTINUED | OUTPATIENT
Start: 2020-01-01 | End: 2020-01-01

## 2020-01-01 RX ORDER — MORPHINE SULFATE 50 MG/1
1 CAPSULE, EXTENDED RELEASE ORAL ONCE
Refills: 0 | Status: DISCONTINUED | OUTPATIENT
Start: 2020-01-01 | End: 2020-01-01

## 2020-01-01 RX ORDER — MONTELUKAST 4 MG/1
1 TABLET, CHEWABLE ORAL
Qty: 0 | Refills: 0 | DISCHARGE

## 2020-01-01 RX ORDER — HYDROMORPHONE HYDROCHLORIDE 2 MG/ML
0.2 INJECTION INTRAMUSCULAR; INTRAVENOUS; SUBCUTANEOUS EVERY 6 HOURS
Refills: 0 | Status: DISCONTINUED | OUTPATIENT
Start: 2020-01-01 | End: 2020-01-01

## 2020-01-01 RX ORDER — PANTOPRAZOLE SODIUM 20 MG/1
1 TABLET, DELAYED RELEASE ORAL
Qty: 0 | Refills: 0 | DISCHARGE

## 2020-01-01 RX ORDER — DILTIAZEM HCL 120 MG
1 CAPSULE, EXT RELEASE 24 HR ORAL
Qty: 0 | Refills: 0 | DISCHARGE

## 2020-01-01 RX ORDER — ASPIRIN/CALCIUM CARB/MAGNESIUM 324 MG
1 TABLET ORAL
Qty: 0 | Refills: 0 | DISCHARGE

## 2020-01-01 RX ORDER — LEVOCETIRIZINE DIHYDROCHLORIDE 0.5 MG/ML
1 SOLUTION ORAL
Qty: 0 | Refills: 0 | DISCHARGE

## 2020-01-01 RX ADMIN — Medication 400 MILLIGRAM(S): at 00:58

## 2020-01-01 RX ADMIN — HYDROMORPHONE HYDROCHLORIDE 0.2 MILLIGRAM(S): 2 INJECTION INTRAMUSCULAR; INTRAVENOUS; SUBCUTANEOUS at 16:33

## 2020-01-01 RX ADMIN — SODIUM CHLORIDE 250 MILLILITER(S): 9 INJECTION INTRAMUSCULAR; INTRAVENOUS; SUBCUTANEOUS at 23:38

## 2020-01-01 RX ADMIN — MORPHINE SULFATE 0.5 MG/HR: 50 CAPSULE, EXTENDED RELEASE ORAL at 00:15

## 2020-01-01 RX ADMIN — MORPHINE SULFATE 0.5 MG/HR: 50 CAPSULE, EXTENDED RELEASE ORAL at 23:38

## 2020-01-01 RX ADMIN — HYDROMORPHONE HYDROCHLORIDE 0.2 MILLIGRAM(S): 2 INJECTION INTRAMUSCULAR; INTRAVENOUS; SUBCUTANEOUS at 10:43

## 2020-01-01 RX ADMIN — ROBINUL 0.4 MILLIGRAM(S): 0.2 INJECTION INTRAMUSCULAR; INTRAVENOUS at 00:11

## 2020-01-01 RX ADMIN — MORPHINE SULFATE 1 MILLIGRAM(S): 50 CAPSULE, EXTENDED RELEASE ORAL at 22:25

## 2020-04-12 NOTE — ED ADULT NURSE NOTE - NSIMPLEMENTINTERV_GEN_ALL_ED
Implemented All Fall with Harm Risk Interventions:  [place] to call system. Call bell, personal items and telephone within reach. Instruct patient to call for assistance. Room bathroom lighting operational. Non-slip footwear when patient is off stretcher. Physically safe environment: no spills, clutter or unnecessary equipment. Stretcher in lowest position, wheels locked, appropriate side rails in place. Provide visual cue, wrist band, yellow gown, etc. Monitor gait and stability. Monitor for mental status changes and reorient to person, place, and time. Review medications for side effects contributing to fall risk. Reinforce activity limits and safety measures with patient and family. Provide visual clues: red socks.

## 2020-04-12 NOTE — H&P ADULT - PROBLEM SELECTOR PLAN 3
pt with hypernatremia, BRADLEY and hemoconcentration - indicating mod-severe dehydration   given comfort care, there would be no benefit to IVF hydration at this time   no further blood work/labs

## 2020-04-12 NOTE — ED PROVIDER NOTE - CRITICAL CARE PROVIDED
interpretation of diagnostic studies/consultation with other physicians/conducted a detailed discussion of DNR status/additional history taking/direct patient care (not related to procedure)/documentation/telephone consultation with the patient's family

## 2020-04-12 NOTE — ED PROVIDER NOTE - CLINICAL SUMMARY MEDICAL DECISION MAKING FREE TEXT BOX
89 yo F presenting with hypoxic respiratory failure, DNR/DNI, labs, morphine for comfort, tba 89 yo F presenting with hypoxic respiratory failure, DNR/DNI, labs, morphine for comfort, tba    Attending Physician, NKECHI FRENCH MD: 89F PMHx of COPD, asthma, dementia, htn p/w acute worsening SOB x this AM. Coming from nursing home. Unable to give a history due to illness/dementia. EMS reported SOB and 80% on NRB. In the ED, she is unable to speak, communicate, but is tachypneic at 40-50s, labored breathing, retractions, abdominal breathing, saturating at 85% on NRB. Unable to obtain ROS due to illness/dementia. VS: Reviewed nurses note and confirmed by me. GEN: ill appearing, in mod-severe resp distress. HEAD: NC/AT. EYES: PERRLA/EOMI, 3mm reactive. NECK: Supple, nontender, no deformities. No meningismus. THROAT: no oropharyngeal erythema/exudates, mmm. CV: Tachycardic, no mgr, (+) S1/S2. LUNG:(+) diffuse rhonchi/crackles throughout, tachypneic,  no wheezing. ABD: SOFT, non-distended, non-tender, (+) BS throughout, no rebound/guarding. SKIN: Warm, pink, well perfused, capillary refill < 2sec. NEURO: awake, no motor deficits. PLAN: MOLST form reviewed, DNR/DNI, discussed w/ family about poor prognosis, futile treatment for likely covid/pna. decided on comfort care w/ goals of pain control. will admit for palliative.

## 2020-04-12 NOTE — H&P ADULT - ATTENDING COMMENTS
Principal diagnosis - Hypoxic respiratory failure, Pneumonia due to 2019 nCoV -- J96.01, J12.89. CPT code - 48135.    Patient assigned to me by night hospitalist in charge for management and care for patient for this evening only. Care to be resumed by day hospitalist in the morning and thereafter.

## 2020-04-12 NOTE — GOALS OF CARE CONVERSATION - ADVANCED CARE PLANNING - CONVERSATION/DISCUSSION
Treatment Options/Diagnosis/MOLST Discussed/Hospice Referral/Prognosis/Palliative Care Referral
Hospice Referral/Diagnosis/MOLST Discussed/Prognosis/Palliative Care Referral/Treatment Options

## 2020-04-12 NOTE — H&P ADULT - ASSESSMENT
89F with PMH of asthma/COPD on 2LNC, HTN, HFpEF, dementia sent from [facility] for respiratory distress - found to be severely hypoxic despite 100%NRB, presumed 2/2 COVID infection given outbreak at facility; pt arrived with MOLST form indicating DNR/DNI and after further discussion with family, pt made comfort care.

## 2020-04-12 NOTE — H&P ADULT - PROBLEM SELECTOR PLAN 2
severe hypoxia presumed 2/2 COVID given pandemic and recent outbreak at facility   pt made comfort care - no further testing or blood work, not eligible for COVID treatment

## 2020-04-12 NOTE — H&P ADULT - NSHPLABSRESULTS_GEN_ALL_CORE
Labs, imaging and EKG personally reviewed and interpreted by me                          16.1   15.58 )-----------( 220      ( 12 Apr 2020 22:21 )             54.0     04-12    155<H>  |  113<H>  |  45<H>  ----------------------------<  168<H>  4.1   |  24  |  1.53<H>    Ca    9.2      12 Apr 2020 22:21    TPro  6.5  /  Alb  2.7<L>  /  TBili  1.3<H>  /  DBili  x   /  AST  17  /  ALT  11  /  AlkPhos  89  04-12    CARDIAC MARKERS ( 12 Apr 2020 22:21 )  x     / x     / 14 U/L / x     / x          PT/INR - ( 12 Apr 2020 22:21 )   PT: 17.2 sec;   INR: 1.48 ratio    PTT - ( 12 Apr 2020 22:21 )  PTT:35.9 sec    Lactate, Blood: 2.3 mmol/L (04-12-20 @ 23:03)    < from: Xray Chest 1 View- PORTABLE-Urgent (04.12.20 @ 22:27) >  ******PRELIMINARY REPORT******        INTERPRETATION:  Small left pleural effusion with associated opacity, likely atelectasis.

## 2020-04-12 NOTE — ED PROVIDER NOTE - ATTENDING CONTRIBUTION TO CARE
Dr. NKECHI Guerin MD: I performed a face to face bedside interview with patient regarding history of present illness, review of symptoms and past medical history. I completed an independent physical exam.  I have discussed patient's plan of care with resident. I agree with note as stated above, having amended the EMR as needed to reflect my findings.   This includes HISTORY OF PRESENT ILLNESS, HIV, PAST MEDICAL/SURGICAL/FAMILY/SOCIAL HISTORY, ALLERGIES AND HOME MEDICATIONS, REVIEW OF SYSTEMS, PHYSICAL EXAM, and any PROGRESS NOTES during the time I functioned as the attending physician for this patient.

## 2020-04-12 NOTE — H&P ADULT - NSHPPHYSICALEXAM_GEN_ALL_CORE
Vital Signs Last 24 Hrs  T(C): 39.2 (12 Apr 2020 22:33), Max: 39.2 (12 Apr 2020 22:33)  T(F): 102.6 (12 Apr 2020 22:33), Max: 102.6 (12 Apr 2020 22:33)  HR: 126 (12 Apr 2020 23:35) (126 - 133)  BP: 111/61 (12 Apr 2020 23:35) (94/64 - 111/61)  BP(mean): --  RR: 44 (12 Apr 2020 23:35) (41 - 45)  SpO2: 76% (12 Apr 2020 23:35) (76% - 85%)

## 2020-04-12 NOTE — ED PROVIDER NOTE - PHYSICAL EXAMINATION
VS: Reviewed nurses note and confirmed by me. GEN: ill appearing, in mod-severe resp distress. HEAD: NC/AT. EYES: PERRLA/EOMI, 3mm reactive. NECK: Supple, nontender, no deformities. No meningismus. THROAT: no oropharyngeal erythema/exudates, mmm. CV: Tachycardic, no mgr, (+) S1/S2. LUNG:(+) diffuse rhonchi/crackles throughout, tachypneic,  no wheezing. ABD: SOFT, non-distended, non-tender, (+) BS throughout, no rebound/guarding. SKIN: Warm, pink, well perfused, capillary refill < 2sec. NEURO: awake, no motor deficits.

## 2020-04-12 NOTE — GOALS OF CARE CONVERSATION - ADVANCED CARE PLANNING - CONVERSATION DETAILS
discussed poor prognosis, possible
Spoke w/ daughter and granddaughter about worsening prognosis, appearing to be in pain, breathing RR 40s, uncomfortable appearing. Pts daughter and granddaughter would like comfort care/palliative care. Goal of making her comfortable w/ pain medication.

## 2020-04-12 NOTE — H&P ADULT - HISTORY OF PRESENT ILLNESS
89F with PMH of asthma/COPD on 2LNC, HTN, HFpEF, dementia sent from [facility] for respiratory distress. Unable to obtain history from pt due to lethargy. Per charts, pt sent for worsening respiratory distress; EMS noted pt to be hypoxic to 70s on arrival, was immediately placed on NRB with improvement in oxygenation to mid80s. [facility] with known outbreak of COVID19+ patients.

## 2020-04-12 NOTE — ED PROVIDER NOTE - OBJECTIVE STATEMENT
89 yo F hx asthma, COPD on 2L NC, HLD, HTN, HFpEF, dementia, sent in from NH for respiratory distress. Patient saturating 70's RA, only improved to mid 80's on NRB as per EMS. No reported fevers/cough. Patient presents with MOLST- DNR/DNI.

## 2020-04-12 NOTE — ED ADULT NURSE REASSESSMENT NOTE - NS ED NURSE REASSESS COMMENT FT1
Pt family contacted. POC is to make pt comfortable. Pt currently on O2. Pt will be removed from O2 as per MD request.

## 2020-04-12 NOTE — ED PROVIDER NOTE - PROGRESS NOTE DETAILS
MANOLO SHIELDS: spoke w/ daughter and granddaughter about pts poor prognosis. likely death within < 24 hrs, current treatment w/ oxygen is futile, as her condition is not reversible. They agree w/ comfort care and goal of treating the patient's pain w/ morphine. Started pt on morphine drip 1mg/hr, given 1 mg morphine bolus. Pending admit for palliative/comfort measures. All labs/blood draws stopped. Neela PGY3: vanessa Cyr [phone] Attending Prateek:  evaluated pt after s/o: 89 yo F hx asthma, COPD, currently dnr dni, brought in w/ sob, possibly covid, pt was made comfort care w/ no aggressive measures. upon my evaluation pt was just getting morphine gtt, on nc and NRB, satting 80% with what appeared to be mild discomfort, titrating gtt up to comfort (discussed w/ nurse) resident discussed w/ family again about possibility of withdrawing 02 as she is perideath and we are prolonging at this time. Family member wants to discuss w/ another member who is a pulmonologist at this point they would like to continue 02. Main goal at this point to make sure pt is not in extreme discomfort, will reassess.

## 2020-04-12 NOTE — ED PROVIDER NOTE - CARE PLAN
Principal Discharge DX:	Respiratory failure with hypoxia  Secondary Diagnosis:	Suspected 2019 novel coronavirus infection

## 2020-04-12 NOTE — ED ADULT NURSE NOTE - OBJECTIVE STATEMENT
Pt is a 89y Female c/o SOB, respiratory distress. Pt brought in by EMS from [facility]. Pt placed on non-rebreather sat 94-97% Pt appears uncomfortable in bed. Pt PMHx Asthma, COPD. Known COVID19 outbreak at [facility]. Pt safety maintained.

## 2020-04-12 NOTE — H&P ADULT - PROBLEM SELECTOR PLAN 1
pt with severe hypoxia, currently on 100%NRB and saturating mid80s and remains tachypneic  per ED discussion with family, futility of further treatment explained and pts life expectancy is minutes to hours at this time -  pt made comfort care  c/w morphine gtt  no further vitals, lab draws pt with severe hypoxia, currently on 100%NRB and saturating mid80s and remains tachypneic  per ED discussion with family, futility of further treatment explained and pts life expectancy is hours to days at this time -  pt made comfort care  c/w morphine gtt  no further vitals, lab draws

## 2020-04-13 PROBLEM — J45.909 UNSPECIFIED ASTHMA, UNCOMPLICATED: Chronic | Status: ACTIVE | Noted: 2017-04-15

## 2020-04-13 NOTE — CONSULT NOTE ADULT - SUBJECTIVE AND OBJECTIVE BOX
HPI:  89F with PMH of asthma/COPD on 2LNC, HTN, HFpEF, dementia sent from [facility] for respiratory distress. Unable to obtain history from pt due to lethargy. Per charts, pt sent for worsening respiratory distress; EMS noted pt to be hypoxic to 70s on arrival, was immediately placed on NRB with improvement in oxygenation to mid80s. [facility] with known outbreak of COVID19+ patients. (12 Apr 2020 23:55)    Palliative consulted to assist with symptom management at end of life    PERTINENT PM/SXH:   COPD (chronic obstructive pulmonary disease)  Asthma      FAMILY HISTORY: unable to obtain given current mental status, encephalopathic    ITEMS NOT CHECKED ARE NOT PRESENT    SOCIAL HISTORY:   Significant other/partner[x ]  Children[x ]  Yazidism/Spirituality:  Substance hx:  [ ]   Tobacco hx:  [ ]   Alcohol hx: [ ]   Home Opioid hx:  [ ] I-Stop Reference No:  Living Situation: [ ]Home  [x ]Long term care  [ ]Rehab [ ]Other    ADVANCE DIRECTIVES:    DNR  Yes  MOLST  [x ]  Living Will  [ ]   DECISION MAKER(s):  [ ] Health Care Proxy(s)  [x ] Surrogate(s)  [ ] Guardian           Name(s): Phone Number(s):  per EMR    BASELINE (I)ADL(s) (prior to admission):  [place]: [ ]Total  [ ] Moderate [ ]Dependent    Allergies    No Known Allergies    Intolerances    MEDICATIONS  (STANDING):  HYDROmorphone  Injectable 0.2 milliGRAM(s) IV Push once  HYDROmorphone  Injectable 0.2 milliGRAM(s) IV Push every 6 hours    MEDICATIONS  (PRN):  bisacodyl Suppository 10 milliGRAM(s) Rectal daily PRN Constipation  glycopyrrolate Injectable 0.4 milliGRAM(s) IV Push every 6 hours PRN secretions  HYDROmorphone  Injectable 0.2 milliGRAM(s) IV Push every 1 hour PRN dyspnea  HYDROmorphone  Injectable 0.2 milliGRAM(s) IV Push every 1 hour PRN pain  LORazepam   Injectable 0.2 milliGRAM(s) IV Push every 2 hours PRN anxiety/agtiation /refractory dyspnea    PRESENT SYMPTOMS: [x ]Unable to obtain due to poor mentation   Source if other than patient:  [ ]Family   [ x]Team- ROS PER PRIMARY TEAM DUE TO COVID     Pain: [ ]yes [ ]no  QOL impact -   Location -                    Aggravating factors -  Quality -  Radiation -  Timing-  Severity (0-10 scale):  Minimal acceptable level (0-10 scale):     PAIN AD Score:     http://geriatrictoolkit.[email or web address]/painad.pdf (press ctrl +  left click to view)    Dyspnea:                           [ ]Mild [ ]Moderate [ x]Severe  Anxiety:                             [ ]Mild [ ]Moderate [ ]Severe  Fatigue:                             [ ]Mild [ ]Moderate [ ]Severe  Nausea:                             [ ]Mild [ ]Moderate [ ]Severe  Loss of appetite:              [ ]Mild [ ]Moderate [ ]Severe  Constipation:                    [ ]Mild [ ]Moderate [ ]Severe    Other Symptoms:  [ ]All other review of systems negative     Palliative Performance Status Version 2:      10   %    http://npcrc.org/files/news/palliative_performance_scale_ppsv2.pdf  PHYSICAL EXAM:  Vital Signs Last 24 Hrs  T(C): 39.2 (12 Apr 2020 22:33), Max: 39.2 (12 Apr 2020 22:33)  T(F): 102.6 (12 Apr 2020 22:33), Max: 102.6 (12 Apr 2020 22:33)  HR: 108 (13 Apr 2020 04:35) (108 - 133)  BP: 99/64 (13 Apr 2020 04:35) (94/64 - 111/61)  BP(mean): 67 (13 Apr 2020 02:18) (67 - 67)  RR: 29 (13 Apr 2020 04:35) (29 - 45)  SpO2: 85% (13 Apr 2020 04:35) (76% - 92%) I&O's Summary    GENERAL: DEFERRED DUE TO COVID19  [ ]Alert  [ ]Oriented x   [ ]Lethargic  [ ]Cachexia  [ ]Unarousable  [ ]Verbal  [ ]Non-Verbal  Behavioral: DEFERRED DUE TO COVID19  [ ] Anxiety  [ ] Delirium [ ] Agitation [ ] Other  HEENT:DEFERRED DUE TO COVID19  [ ]Normal   [ ]Dry mouth   [ ]ET Tube/Trach  [ ]Oral lesions  PULMONARY: DEFERRED DUE TO COVID19  [ ]Clear [ ]Tachypnea  [ ]Audible excessive secretions   [ ]Rhonchi        [ ]Right [ ]Left [ ]Bilateral  [ ]Crackles        [ ]Right [ ]Left [ ]Bilateral  [ ]Wheezing     [ ]Right [ ]Left [ ]Bilatera  [ ]Diminished breath sounds [ ]right [ ]left [ ]bilateral  CARDIOVASCULAR:  DEFERRED DUE TO COVID19  [ ]Regular [ ]Irregular [ ]Tachy  [ ]Reji [ ]Murmur [ ]Other  GASTROINTESTINAL:DEFERRED DUE TO COVID19  [ ]Soft  [ ]Distended   [ ]+BS  [ ]Non tender [ ]Tender  [ ]PEG [ ]OGT/ NGT  Last BM:   GENITOURINARY:DEFERRED DUE TO COVID19  [ ]Normal [ ] Incontinent   [ ]Oliguria/Anuria   [ ]Brooks  MUSCULOSKELETAL: DEFERRED DUE TO COVID19  [ ]Normal   [ ]Weakness  [ ]Bed/Wheelchair bound [ ]Edema  NEUROLOGIC: DEFERRED DUE TO COVID19  [ ]No focal deficits  [ ]Cognitive impairment  [ ]Dysphagia [ ]Dysarthria [ ]Paresis [ ]Other   SKIN: DEFERRED DUE TO COVID19  [ ]Normal    [ ]Rash  [ ]Pressure ulcer(s)       Present on admission [ ]y [ ]n    CRITICAL CARE:  [ ] Shock Present  [ ]Septic [ ]Cardiogenic [ ]Neurologic [ ]Hypovolemic  [ ]  Vasopressors [ ]  Inotropes   [x ]Respiratory failure present [ ]Mechanical ventilation [ ]Non-invasive ventilatory support [ ]High flow  [x ]Acute  [ ]Chronic [x ]Hypoxic  [ ]Hypercarbic [ ]Other  [ ]Other organ failure     LABS:                        16.1   15.58 )-----------( 220      ( 12 Apr 2020 22:21 )             54.0   04-12    155<H>  |  113<H>  |  45<H>  ----------------------------<  168<H>  4.1   |  24  |  1.53<H>    Ca    9.2      12 Apr 2020 22:21    TPro  6.5  /  Alb  2.7<L>  /  TBili  1.3<H>  /  DBili  x   /  AST  17  /  ALT  11  /  AlkPhos  89  04-12  PT/INR - ( 12 Apr 2020 22:21 )   PT: 17.2 sec;   INR: 1.48 ratio         PTT - ( 12 Apr 2020 22:21 )  PTT:35.9 sec      RADIOLOGY & ADDITIONAL STUDIES:  < from: Xray Chest 1 View- PORTABLE-Urgent (04.12.20 @ 22:27) >    EXAM:  XR CHEST PORTABLE URGENT 1V                            PROCEDURE DATE:  04/12/2020            INTERPRETATION:  CLINICAL INFORMATION: Shortness of breath    TECHNIQUE: AP view of the chest.    COMPARISON: Chest x-ray dated 4/15/2017    FINDINGS:     Small left pleural effusion with associated atelectasis. No pneumothorax.  Calcified aortic arch. The cardiomediastinal silhouette is otherwise unchanged.  Multilevel degenerative changes of the spine are noted    IMPRESSION:   Small left pleural effusion with associated atelectasis.          YADIEL RIDDLE M.D., RADIOLOGY RESIDENT  This document has been electronically signed.  KLARISSA CERVANTES M.D., ATTENDING RADIOLOGIST  This document has been electronically signed. Apr 13 20206:42AM                < end of copied text >      PROTEIN CALORIE MALNUTRITION PRESENT: [ ]mild [ ]moderate [ ]severe [ ]underweight [ ]morbid obesity  https://www.andeal.org/vault/2440/web/files/ONC/Table_Clinical%20Characteristics%20to%20Document%20Malnutrition-White%20JV%20et%20al%202012.pdf        [x ]PPSV2 < or = to 30% [ ]significant weight loss  [ x]poor nutritional intake  [ ]anasarca     Albumin, Serum: 2.7 g/dL (04-12-20 @ 22:21)   [ ]Artificial Nutrition      REFERRALS:   [ ]Chaplaincy  [ ]Hospice  [ ]Child Life  [ ]Social Work  [ ]Case management [ ]Holistic Therapy     Goals of Care Document: NKECHI Guerin (04-12-20 @ 22:57)  Goals of Care Conversation:   Participants:  · Child(ana)  Granddon, daughter    Advance Directives:  · Does patient have Advance Directive  Yes  · Indicate Type  Do Not Resuscitate (DNR); Medical Orders for Life-Sustaining Treatment (MOLST); DNR/DNI  · Are any of the items on the chart  Yes  · Specify which ones are on chart  Do Not Resuscitate (DNR)  Medical Orders for Life-Sustaining Treatment (MOLST)  DNR/DNI    Conversation Discussion:  · Conversation  Diagnosis; Prognosis; MOLST Discussed; Treatment Options; Hospice Referral; Palliative Care Referral  · Conversation Details  Spoke w/ daughter and granddaughter about worsening prognosis, appearing to be in pain, breathing RR 40s, uncomfortable appearing. Pts daughter and granddaughter would like comfort care/palliative care. Goal of making her comfortable w/ pain medication.    Personal Advance Directives Treatment Guidelines:   Treatment Guidelines:  · Decision Maker  Health Care Proxy  · Treatment Guidelines  DNR Order; Comfort measures only; No blood draws  · Treatment Guideline Comments  Comfort measures, no invasive treatments.    Duration of Advanced Care Planning Meeting:  · Time spent (in minutes)  15      Electronic Signatures:  Lucian Guerin)  (Signed 12-Apr-2020 23:01)  	Authored: Goals of Care Conversation, Personal Advance Directives Treatment Guidelines      Last Updated: 12-Apr-2020 23:01 by Lucian Guerin)

## 2020-04-13 NOTE — PROGRESS NOTE ADULT - ASSESSMENT
#COVID19+, PNA, Comfort care  - #COVID19+, PNA, multiorgan failure  -continuing comfort care  -palliative care rec's appreciated  -dilaudid prn, lorazepam prn, glycopyrrolate prn, bisacodyl suppository    family, grandson Dr. Ananth Cyr [phone], updated 4/13

## 2020-04-13 NOTE — DISCHARGE NOTE FOR THE EXPIRED PATIENT - HOSPITAL COURSE
89F with PMH of asthma/COPD on 2LNC, HTN, HFpEF, dementia sent from [facility] for respiratory distress - found to be severely hypoxic despite 100%NRB, presumed 2/2 COVID infection given outbreak at facility; pt arrived with MOLST form indicating DNR/DNI and after further discussion with family, pt made comfort care.   Pt with agonal breathing. Notified by RN for no spontaneous respirations and no palpable pulses. Pt examined at the bedside. Unresponsive to sternal rub and name; no palpable pulses, no heart sounds, no spontaneous respirations, pupils fixed and dilated. Pronounced at 21:31. [facility] notified

## 2020-04-13 NOTE — PROGRESS NOTE ADULT - SUBJECTIVE AND OBJECTIVE BOX
[facility] Division of Hospital Medicine Progress Note    89 wagner dementia, COPD, asthma on 2 L at NH, diastolic dysfunction, from NH  saturating in 70's on admission, delirium on dementia preventing interview  from [facility] where this is a COVID19 outbreak  in discussion w/ family made comfort care in ED, on morphine drip    MEDICATIONS  (STANDING):  HYDROmorphone  Injectable 0.2 milliGRAM(s) IV Push once  HYDROmorphone  Injectable 0.2 milliGRAM(s) IV Push every 6 hours    MEDICATIONS  (PRN):  bisacodyl Suppository 10 milliGRAM(s) Rectal daily PRN Constipation  glycopyrrolate Injectable 0.4 milliGRAM(s) IV Push every 6 hours PRN secretions  HYDROmorphone  Injectable 0.2 milliGRAM(s) IV Push every 1 hour PRN dyspnea  HYDROmorphone  Injectable 0.2 milliGRAM(s) IV Push every 1 hour PRN pain  LORazepam   Injectable 0.2 milliGRAM(s) IV Push every 2 hours PRN anxiety/agtiation /refractory dyspnea    PHYSICAL EXAM:  Vital Signs Last 24 Hrs  T(C): 39.2 (12 Apr 2020 22:33), Max: 39.2 (12 Apr 2020 22:33)  T(F): 102.6 (12 Apr 2020 22:33), Max: 102.6 (12 Apr 2020 22:33)  HR: 108 (13 Apr 2020 04:35) (108 - 133)  BP: 99/64 (13 Apr 2020 04:35) (94/64 - 111/61)  BP(mean): 67 (13 Apr 2020 02:18) (67 - 67)  RR: 29 (13 Apr 2020 04:35) (29 - 45)  SpO2: 85% (13 Apr 2020 04:35) (76% - 92%)    gen:  CV:  resp:    LABS:                        16.1   15.58 )-----------( 220      ( 12 Apr 2020 22:21 )             54.0     04-12    155<H>  |  113<H>  |  45<H>  ----------------------------<  168<H>  4.1   |  24  |  1.53<H>    Ca    9.2      12 Apr 2020 22:21    TPro  6.5  /  Alb  2.7<L>  /  TBili  1.3<H>  /  DBili  x   /  AST  17  /  ALT  11  /  AlkPhos  89  04-12    PT/INR - ( 12 Apr 2020 22:21 )   PT: 17.2 sec;   INR: 1.48 ratio       PTT - ( 12 Apr 2020 22:21 )  PTT:35.9 sec  CARDIAC MARKERS ( 12 Apr 2020 22:21 )  x     / x     / 14 U/L / x     / x        RADIOLOGY & ADDITIONAL TESTS:  Imaging from Last 24 Hours:  Electrocardiogram/QTc Interval:    COORDINATION OF CARE:  Care Discussed with Consultants/Other Providers: [facility] Division of Hospital Medicine Progress Note    89 wagner dementia, COPD, asthma on 2 L at NH, diastolic dysfunction, from NH  saturating in 70's on admission, delirium on dementia preventing interview  from [facility] where there is a COVID19 outbreak  in discussion w/ family made comfort care in ED    could not be interviewed d/t delirium    MEDICATIONS  (STANDING):  HYDROmorphone  Injectable 0.2 milliGRAM(s) IV Push once  HYDROmorphone  Injectable 0.2 milliGRAM(s) IV Push every 6 hours    MEDICATIONS  (PRN):  bisacodyl Suppository 10 milliGRAM(s) Rectal daily PRN Constipation  glycopyrrolate Injectable 0.4 milliGRAM(s) IV Push every 6 hours PRN secretions  HYDROmorphone  Injectable 0.2 milliGRAM(s) IV Push every 1 hour PRN dyspnea  HYDROmorphone  Injectable 0.2 milliGRAM(s) IV Push every 1 hour PRN pain  LORazepam   Injectable 0.2 milliGRAM(s) IV Push every 2 hours PRN anxiety/agtiation /refractory dyspnea    PHYSICAL EXAM:  Vital Signs Last 24 Hrs  T(C): 39.2 (12 Apr 2020 22:33), Max: 39.2 (12 Apr 2020 22:33)  T(F): 102.6 (12 Apr 2020 22:33), Max: 102.6 (12 Apr 2020 22:33)  HR: 108 (13 Apr 2020 04:35) (108 - 133)  BP: 99/64 (13 Apr 2020 04:35) (94/64 - 111/61)  BP(mean): 67 (13 Apr 2020 02:18) (67 - 67)  RR: 29 (13 Apr 2020 04:35) (29 - 45)  SpO2: 85% (13 Apr 2020 04:35) (76% - 92%)    gen: frail acutely ill elderly woman, not responsive to voice or touch  CV: regular tachycardia  resp: shallow breathing, rales bilaterally    LABS:                        16.1   15.58 )-----------( 220      ( 12 Apr 2020 22:21 )             54.0     04-12    155<H>  |  113<H>  |  45<H>  ----------------------------<  168<H>  4.1   |  24  |  1.53<H>    Ca    9.2      12 Apr 2020 22:21    TPro  6.5  /  Alb  2.7<L>  /  TBili  1.3<H>  /  DBili  x   /  AST  17  /  ALT  11  /  AlkPhos  89  04-12    PT/INR - ( 12 Apr 2020 22:21 )   PT: 17.2 sec;   INR: 1.48 ratio       PTT - ( 12 Apr 2020 22:21 )  PTT:35.9 sec  CARDIAC MARKERS ( 12 Apr 2020 22:21 )  x     / x     / 14 U/L / x     / x        RADIOLOGY & ADDITIONAL TESTS:  Imaging from Last 24 Hours:  Electrocardiogram/QTc Interval:    COORDINATION OF CARE:  Care Discussed with Consultants/Other Providers:

## 2020-04-13 NOTE — CONSULT NOTE ADULT - ASSESSMENT
89F with PMH of asthma/COPD on 2LNC, HTN, HFpEF, dementia sent from [facility] for respiratory distress. Palliative involved for symptom management at end of life

## 2020-04-13 NOTE — CONSULT NOTE ADULT - PROBLEM SELECTOR RECOMMENDATION 9
d/c morphine drip given renal dysfunction, drip will not reach steady state for 15-20 hours and should not be autotitrated  IV push dosing should be utilized for acute symptom management  start Dilaudid 0.2mg q6h ATC, dilaudid 0.2mg q1h prn for dyspnea  goal RR <24 and should be given for labored breathing  supplemental oxygen  DNR/DNI per EMR

## 2020-04-13 NOTE — CHART NOTE - NSCHARTNOTEFT_GEN_A_CORE
Called by bedside by nurse for cardiopulmonary arrest 2/2 COVID/ multiorgan failure     On physical exam, no spontaneous movements were present. Patient did not respond to verbal or physical stimuli. Pupils were mid-dilated and fixed. No breath sounds were appreciated over either lung field. No carotid pulses were palpable. No heart sounds were auscultated.   Patient pronounced dead at 21:31. Attending notified.  Family notified @ [phone] x 2; and [phone] Dr. Ananth Cyr. Autopsy declined. [facility] notified.     Catia ELLIOTT  #[id number] Called by bedside by nurse for cardiopulmonary arrest 2/2 COVID/ multiorgan failure     On physical exam, no spontaneous movements were present. Patient did not respond to verbal or physical stimuli. Pupils were mid-dilated and fixed. No breath sounds were appreciated over either lung field. No carotid pulses were palpable. No heart sounds were auscultated.   Patient pronounced dead at 21:31. Attending notified.  Family notified @ [phone] x 2; and [phone] Dr. Ananth Cyr. Autopsy declined. Not ME case 2/2 COVID. [facility] notified.     Catia ELLIOTT  #[id number]

## 2020-04-14 LAB
GRAM STN FLD: SIGNIFICANT CHANGE UP
METHOD TYPE: SIGNIFICANT CHANGE UP
MSSA DNA SPEC QL NAA+PROBE: SIGNIFICANT CHANGE UP

## 2020-04-16 LAB
-  AMPICILLIN/SULBACTAM: SIGNIFICANT CHANGE UP
-  CEFAZOLIN: SIGNIFICANT CHANGE UP
-  CLINDAMYCIN: SIGNIFICANT CHANGE UP
-  ERYTHROMYCIN: SIGNIFICANT CHANGE UP
-  GENTAMICIN: SIGNIFICANT CHANGE UP
-  OXACILLIN: SIGNIFICANT CHANGE UP
-  RIFAMPIN: SIGNIFICANT CHANGE UP
-  TETRACYCLINE: SIGNIFICANT CHANGE UP
-  TRIMETHOPRIM/SULFAMETHOXAZOLE: SIGNIFICANT CHANGE UP
-  VANCOMYCIN: SIGNIFICANT CHANGE UP
CULTURE RESULTS: SIGNIFICANT CHANGE UP
METHOD TYPE: SIGNIFICANT CHANGE UP
ORGANISM # SPEC MICROSCOPIC CNT: SIGNIFICANT CHANGE UP
SPECIMEN SOURCE: SIGNIFICANT CHANGE UP

## 2020-04-18 LAB
CULTURE RESULTS: SIGNIFICANT CHANGE UP
SPECIMEN SOURCE: SIGNIFICANT CHANGE UP

## 2022-06-13 NOTE — ED ADULT NURSE NOTE - CADM POA PRESS ULCER
Fax received from [facility] regarding Pt  Pt location at SNF: [address] wing  Fax sent by: RN    Fax regarding concern: Urinary Symptoms    Assessment:         Any recommendations or new orders?      
Noted.   Forwarded to [facility].    
UA  UC if indicated  Start UTI stat 30 cc po BID  
No

## 2022-06-24 NOTE — ED PROVIDER NOTE - NS ED MD DISPO DIVISION
[facility] Epidermal Autograft Text: The defect edges were debeveled with a #15 scalpel blade.  Given the location of the defect, shape of the defect and the proximity to free margins an epidermal autograft was deemed most appropriate.  Using a sterile surgical marker, the primary defect shape was transferred to the donor site. The epidermal graft was then harvested.  The skin graft was then placed in the primary defect and oriented appropriately.

## 2023-02-22 NOTE — ED ADULT NURSE NOTE - BP NONINVASIVE SYSTOLIC (MM HG)
Deteriorating patient status - Patient was clinically upgraded due to deteriorating patient status. 110

## 2024-10-16 NOTE — DISCHARGE NOTE ADULT - MEDICATION SUMMARY - MEDICATIONS TO CHANGE
Patient has been compliant with compression, elevation and exercise for at least 4 weeks yet swelling persists. Hyperplasia are present with this edema.    
I will SWITCH the dose or number of times a day I take the medications listed below when I get home from the hospital:  None

## 2024-12-11 NOTE — DISCHARGE NOTE ADULT - ABILITY TO HEAR (WITH HEARING AID OR HEARING APPLIANCE IF NORMALLY USED):
Adequate: hears normal conversation without difficulty
Assistance with ambulation/Assistance OOB with selected safe patient handling equipment/Communicate Risk of Fall with Harm to all staff/Monitor gait and stability/Reinforce activity limits and safety measures with patient and family/Sit up slowly, dangle for a short time, stand at bedside before walking/Tailored Fall Risk Interventions/Use of alarms - bed, chair and/or voice tab/Visual Cue: Yellow wristband and red socks/Bed in lowest position, wheels locked, appropriate side rails in place/Call bell, personal items and telephone in reach/Instruct patient to call for assistance before getting out of bed or chair/Non-slip footwear when patient is out of bed/[place] to call system/Physically safe environment - no spills, clutter or unnecessary equipment/Purposeful Proactive Rounding/Room/bathroom lighting operational, light cord in reach